# Patient Record
Sex: FEMALE | Race: BLACK OR AFRICAN AMERICAN
[De-identification: names, ages, dates, MRNs, and addresses within clinical notes are randomized per-mention and may not be internally consistent; named-entity substitution may affect disease eponyms.]

---

## 2019-11-10 ENCOUNTER — HOSPITAL ENCOUNTER (EMERGENCY)
Dept: HOSPITAL 74 - JER | Age: 74
LOS: 1 days | Discharge: LEFT BEFORE DISCHARGE | End: 2019-11-11
Payer: COMMERCIAL

## 2019-11-10 VITALS — HEART RATE: 65 BPM | DIASTOLIC BLOOD PRESSURE: 73 MMHG | TEMPERATURE: 97.2 F | SYSTOLIC BLOOD PRESSURE: 163 MMHG

## 2019-11-10 VITALS — BODY MASS INDEX: 34.8 KG/M2

## 2019-11-10 DIAGNOSIS — N18.9: ICD-10-CM

## 2019-11-10 DIAGNOSIS — R42: ICD-10-CM

## 2019-11-10 DIAGNOSIS — N17.9: Primary | ICD-10-CM

## 2019-11-10 DIAGNOSIS — E11.22: ICD-10-CM

## 2019-11-10 DIAGNOSIS — I12.9: ICD-10-CM

## 2019-11-10 DIAGNOSIS — E78.5: ICD-10-CM

## 2019-11-10 DIAGNOSIS — Z79.84: ICD-10-CM

## 2019-11-10 LAB
ALBUMIN SERPL-MCNC: 3.8 G/DL (ref 3.4–5)
ALP SERPL-CCNC: 122 U/L (ref 45–117)
ALT SERPL-CCNC: 20 U/L (ref 13–61)
ANION GAP SERPL CALC-SCNC: 6 MMOL/L (ref 8–16)
APPEARANCE UR: CLEAR
AST SERPL-CCNC: 20 U/L (ref 15–37)
BACTERIA # UR AUTO: 107.4 /HPF
BASOPHILS # BLD: 0.7 % (ref 0–2)
BILIRUB SERPL-MCNC: 0.3 MG/DL (ref 0.2–1)
BILIRUB UR STRIP.AUTO-MCNC: NEGATIVE MG/DL
BUN SERPL-MCNC: 69.3 MG/DL (ref 7–18)
CALCIUM SERPL-MCNC: 9.3 MG/DL (ref 8.5–10.1)
CASTS URNS QL MICRO: 0 /LPF (ref 0–8)
CHLORIDE SERPL-SCNC: 110 MMOL/L (ref 98–107)
CO2 SERPL-SCNC: 24 MMOL/L (ref 21–32)
COLOR UR: YELLOW
CREAT SERPL-MCNC: 3.9 MG/DL (ref 0.55–1.3)
DEPRECATED RDW RBC AUTO: 13.7 % (ref 11.6–15.6)
EOSINOPHIL # BLD: 0.6 % (ref 0–4.5)
EPITH CASTS URNS QL MICRO: 2 /HPF
GLUCOSE SERPL-MCNC: 130 MG/DL (ref 74–106)
HCT VFR BLD CALC: 32.8 % (ref 32.4–45.2)
HGB BLD-MCNC: 10.4 GM/DL (ref 10.7–15.3)
KETONES UR QL STRIP: NEGATIVE
LEUKOCYTE ESTERASE UR QL STRIP.AUTO: NEGATIVE
LYMPHOCYTES # BLD: 9.6 % (ref 8–40)
MCH RBC QN AUTO: 27.5 PG (ref 25.7–33.7)
MCHC RBC AUTO-ENTMCNC: 31.7 G/DL (ref 32–36)
MCV RBC: 86.8 FL (ref 80–96)
MONOCYTES # BLD AUTO: 2.6 % (ref 3.8–10.2)
NEUTROPHILS # BLD: 86.5 % (ref 42.8–82.8)
NITRITE UR QL STRIP: NEGATIVE
PH UR: 6 [PH] (ref 5–8)
PLATELET # BLD AUTO: 292 K/MM3 (ref 134–434)
PMV BLD: 10.3 FL (ref 7.5–11.1)
POTASSIUM SERPLBLD-SCNC: 5.6 MMOL/L (ref 3.5–5.1)
PROT SERPL-MCNC: 8.4 G/DL (ref 6.4–8.2)
PROT UR QL STRIP: (no result)
PROT UR QL STRIP: NEGATIVE
RBC # BLD AUTO: 1 /HPF (ref 0–4)
RBC # BLD AUTO: 3.77 M/MM3 (ref 3.6–5.2)
SODIUM SERPL-SCNC: 139 MMOL/L (ref 136–145)
SP GR UR: 1.01 (ref 1.01–1.03)
UROBILINOGEN UR STRIP-MCNC: 0.2 MG/DL (ref 0.2–1)
WBC # BLD AUTO: 15.9 K/MM3 (ref 4–10)
WBC # UR AUTO: 5 /HPF (ref 0–5)

## 2019-11-10 PROCEDURE — 3E033GC INTRODUCTION OF OTHER THERAPEUTIC SUBSTANCE INTO PERIPHERAL VEIN, PERCUTANEOUS APPROACH: ICD-10-PCS | Performed by: EMERGENCY MEDICINE

## 2019-11-10 NOTE — PDOC
Documentation entered by Analisa Solitario SCRIBE, acting as scribe for David Bauman MD.








David Bauman MD:  This documentation has been prepared by the averyibe, Analisa Solitario SCRIBE, under my direction and personally reviewed by me in its entirety.  I 

confirm that the documentation accurately reflects all work, treatment, 

procedures, and medical decision making performed by me.  





Attending Attestation





- Resident


Resident Name: AdrianoCaprice





- ED Attending Attestation


I have performed the following: I have examined & evaluated the patient, The 

case was reviewed & discussed with the resident, I agree w/resident's findings 

& plan, Exceptions are as noted





- HPI


HPI: 





11/10/19 22:18


74 F with h/o HTN, HLD, DM, presenting to ED with 2 hour episode of room-

spinning dizziness and vomiting. Pt states that she was at home at rest when 

she suddenly felt dizzy. Pt describes a room-spinning sensation that caused her 

to lose her balance. Denies any falls/headstrike. Pt reports having difficulty 

ambulating due to imbalance but denies any weakness/numbness in any extremity. 

Pt denies HA. Endorses one episode of nausea and vomiting. Pt states that her 

dizziness resolved by the time she arrived to the ER. Now denies any dizziness. 

Denies N/V. Denies any complaints.





- Physicial Exam


PE: 





11/10/19 22:20


"GENERAL: Awake, alert, and fully oriented, in no acute distress.


HEAD: No signs of trauma


EYES: PERRLA, EOMI, sclera anicteric, conjunctiva clear


ENT: Auricles normal inspection, hearing grossly normal, nares patent, 

oropharynx clear without exudates. Moist mucosa


NECK: Nontender, no stepoffs, Normal ROM, supple, no lymphadenopathy, JVD, or 

masses


LUNGS: Breath sounds equal, clear to auscultation bilaterally.  No wheezes, and 

no crackles


HEART: Regular rate and rhythm, normal S1 and S2, no murmurs, rubs or gallops


ABDOMEN: Soft, nontender, normoactive bowel sounds.  No guarding, no rebound.  

No masses


EXTREMITIES: Normal range of motion, no edema.  No clubbing or cyanosis. No 

cords, erythema, or tenderness


NEUROLOGICAL: Cranial nerves II through XII intact. 5/5 strength and sensation 

in all extremities, Normal speech, normal gait, normal cerebellar function


SKIN: Warm, Dry, normal turgor, no rashes or lesions noted.





- Medical Decision Making





11/10/19 22:20


74 F with transient episode of room-spinning dizziness. Suspect BPPV. Pt 

without any neuro deficits at this time, now asymptomatic. Low suspicion for CVA

/TIA. Will r/o ACS, though pt with no CP/SOB.


- Labs, trop


- CT head





11/11/19 00:43


CT head negative





Labs notable for HERRERA, Cr 3.7, up from 2





I discussed with pt her results and the need for further evaluation of this 

change in her renal function. I told her if it worsens, she could develop renal 

failure and eventually need dialysis. Pt expresses understanding but states she 

feels well and will f/u with her primary doctor.





The patient is clinically sober, free from distracting injury, appears to have 

intact insight and judgment and reason and in my opinion has the capacity to 

make decisions. The patient presented with dizziness and elevated creatinine. I 

have explained that I am concerned that this may represent renal failure; they 

have verbalized an understanding of my concerns. I have told the patient that 

they could end up on dialysis if this is not addressed. I have discussed the 

need for renal consultation and admission to the hospital to get more 

information about potential causes of the patients renal insufficiency. I have 

told the patient that if they leave, they could get much worse, could become 

critically ill, and could possibly become disabled or die. I have asked them to 

stay in the hospital for admission and renal consultation.  I have discussed 

these concerns with the patients family who is at the bedside and they are 

unable to convince them to stay for further evaluation. The patient is 

unwilling to stay overnight for monitoring. She is refusing any further care 

and is leaving against medical advice. I am unable to convince the patient to 

stay, I have asked them to return as soon as possible to complete their 

evaluation. I have answered all their questions.

## 2019-11-10 NOTE — PDOC
History of Present Illness





- General


Chief Complaint: Lightheaded


Stated Complaint: DIZZINESS/VOMITING


Time Seen by Provider: 11/10/19 20:34


History Source: Patient


Exam Limitations: No Limitations





- History of Present Illness


Initial Comments: 


Pt is a 75 yo F, with PMH of HTN, HLD, DM, CKD, who is presenting with new-

onset vertigo x2 hours prior to presentation. Pt states she was rising from 

sitting to standing, when she felt "the room spinning around". Pt was able to 

ease herself to sitting position, no falls, no LOC. The episode lasted for 

about an hour, and induced 3 episodes of NBNB vomiting. The vertigo improved by 

ED arrival, and she has been able to ambulate on her own. Pt has never had 

these symptoms before. Pt endorses recent nasal congestion and ear "fullness" 

due to her seasonal allergies. Pt denies any recent fevers/chills, headache, 

vision changes, syncope, cough, chest pain, palpitations, SOB, abdominal pain, 

urinary symptoms, diarrhea/constipation, or leg swelling.





Allergies: NKDA


PCP: Dr. Celestina Carpenter





Social: Pt denies any cigarette, alcohol, or drug use.


Pt denies any recent travel or sick contacts.


Surgical: appendectomy, hysterectomy


Family: no relevant history.


11/10/19 22:41











Past History





- Travel


Traveled outside of the country in the last 30 days: No


Close contact w/someone who was outside of country & ill: No





- Past Medical History


Allergies/Adverse Reactions: 


 Allergies











Allergy/AdvReac Type Severity Reaction Status Date / Time


 


No Known Allergies Allergy   Verified 12/08/16 06:32











Home Medications: 


Ambulatory Orders





Aspirin [ASA -] 81 mg PO DAILY 12/08/16 


Atorvastatin Calcium [Lipitor] 10 mg PO HS 12/08/16 


Calcitriol [Calcitriol -] 0.25 mcg PO ANDINO 12/08/16 


Carvedilol [Coreg -] 12.5 mg PO BID 12/08/16 


Glimepiride [Amaryl -] 4 mg PO DAILY 12/08/16 


Sitagliptin Phosphate [Januvia -] 50 mg PO DAILY 12/08/16 


Valsartan [Diovan] 1 tab PO DAILY 12/08/16 


Zolpidem Tartrate [Ambien] 10 mg PO HS 12/08/16 


Levofloxacin [Levaquin] 500 mg PO DAILY #10 tablet 12/12/16 


metroNIDAZOLE [Flagyl -] 500 mg PO TID #30 tablet 12/12/16 








Diabetes: Yes


HTN: Yes


Hypercholesterolemia: Yes





- Psycho Social/Smoking Cessation Hx


Smoking History: Never smoked


Have you smoked in the past 12 months: No


If you are a former smoker, when did you quit?: 5 YRS AGO


Hx Alcohol Use: Yes (2 drinks/ night)


Drug/Substance Use Hx: No


Substance Use Type: None


Hx Substance Use Treatment: No





**Review of Systems





- Review of Systems


Able to Perform ROS?: Yes


Is the patient limited English proficient: No


Constitutional: Yes: Weight Stable.  No: Chills, Diaphoresis, Fever, Loss of 

Appetite, Malaise, Weakness


HEENTM: Yes: Nose Congestion.  No: Recent change in vision, Tinnitus, Hearing 

Loss, Throat Pain, Throat Swelling, Difficulty Swallowing


Respiratory: No: Cough, Orthopnea, Shortness of Breath


Cardiac (ROS): Yes: Edema (b/l leg swelling, at baseline).  No: Chest Pain, 

Irregular Heart Rate, Lightheadedness, Palpitations, Syncope, Chest Tightness


ABD/GI: Yes: Nausea, Vomiting.  No: Constipated, Diarrhea, Poor Appetite, Poor 

Fluid Intake


: No: Burning, Dysuria, Frequency, Flank Pain, Pain, Urgency


Musculoskeletal: No: Back Pain, Joint Pain, Muscle Pain, Muscle Weakness


Integumentary: No: Rash


Neurological: Yes: Dizziness.  No: Headache, Numbness, Seizure, Weakness, 

Unsteady Gait, Ataxia


Psychiatric: No: Sleep Pattern Change, Change in Appetite


Endocrine: No: Increased Urine, Change in Weight


Hematologic/Lymphatic: Yes: Anemia.  No: Blood Clots, Easy Bleeding, Easy 

Bruising


All Other Systems: Reviewed and Negative





*Physical Exam





- Vital Signs


 Last Vital Signs











Temp Pulse Resp BP Pulse Ox


 


 97.2 F L  65   20   163/73   100 


 


 11/10/19 19:39  11/10/19 19:39  11/10/19 19:39  11/10/19 19:39  11/10/19 19:39














- Physical Exam


Comments: 


Vitals stable, pt afebrile. Pt in NAD, obese body habitus. 


Pt alert and oriented x3.


CNs generally intact, muscular strength and sensation intact. Cerebellar exam 

WNL. Romberg negative. Exam did not induce vertigo.


No midline spinal tenderness, step-offs, or crepitus. 


Head normocephalic, atraumatic.


Eyes PERRLA, EOMI. No nystagmus.


Oropharynx without erythema or exudates, no LAD b/l. 


No nasal congestion. 


Hearing intact. 


Clear heart sounds, S1/S2, no JVD, or heart murmur. +b/l pitting pedal edema to 

mid shins (pt states baseline).


Clear lung sounds, no respiratory distress, wheezes, crackles, or accessory 

muscle use. 


No abdominal or CVA tenderness to palpation, no rebound, no guarding. Abdomen 

soft, non-distended, and with normoactive bowel sounds. 


Skin without jaundice or rash. 


11/10/19 22:53








ED Treatment Course





- LABORATORY


CBC & Chemistry Diagram: 


 11/10/19 21:19





 11/10/19 21:19





Medical Decision Making





- Medical Decision Making


Pt was seen at bedside, also will be seen by attending Dr. Bauman. Pt presenting 

with new-onset vertigo, induced with position and associated with vomiting. Pt 

also endorses recent congestion, most likely BPPV/otoliths. Will evaluate for 

ACS, electrolyte imbalances, central vertigo/masses, cerebellar stroke. Pt now 

asymptomatic. 





Provided 500 mL IV NS, 4 mg zofran, 25 mg PO meclizine for improvement of 

vomiting and vertigo.


Will continue to reassess pt and monitor for symptomatic improvement.








ECG: NSR, intervals WNL (HR 66, , QRS 68, QTc 425). No TWIs or 

significant ST segment changes. No significant changes from prior ECG (12/10/

2016).


11/10/19 22:55





CBC: WBC 15 -- potentially reactive from vomiting, will evaluate UA as well


CMP: K 5.6, BUN 69, Cr 3.9 -- worsening CKD vs HERRERA? Elevated from 2016 levels





UA and Ct non-contrast head pending. 


11/10/19 23:00





Ct head with no acute pathology.


UA negative for infection.


Pt wishes to sign AMA, does not wish to stay for continued hydration or repeat 

labwork. 


Strict return precautions with pt understanding. 


11/11/19 00:42








Discharge





- Discharge Information


Problems reviewed: Yes


Clinical Impression/Diagnosis: 


 HERRERA (acute kidney injury), Vertigo





Condition: Stable


Disposition: AGAINST MEDICAL ADVICE





- Admission


No





- Follow up/Referral


Referrals: 


Celestina Carpenter MD [Primary Care Provider] - 





- Patient Discharge Instructions


Patient Printed Discharge Instructions:  DI for Vertigo, DI for Acute Kidney 

Injury


Additional Instructions: 


You were seen in the ER today for dizziness. The results of your labs and 

imaging today showed increased potassium and worsening problems with your 

kidney. You did not wish to stay for further observation and hydration. Please 

follow-up with your primary care doctor and nephrologist within 1-2 days to 

discuss your visit and make sure your symptoms have improved. Please return to 

the ER if you have any worsening dizziness, development of fevers or chills, 

loss of consciousness, inability to tolerate food or fluids, or any other 

concerns.








- Post Discharge Activity

## 2019-11-11 NOTE — EKG
Test Reason : 

Blood Pressure : ***/*** mmHG

Vent. Rate : 066 BPM     Atrial Rate : 066 BPM

   P-R Int : 132 ms          QRS Dur : 068 ms

    QT Int : 406 ms       P-R-T Axes : 043 -29 035 degrees

   QTc Int : 425 ms

 

NORMAL SINUS RHYTHM

NORMAL ECG

WHEN COMPARED WITH ECG OF 10-DEC-2016 06:00,

PREMATURE SUPRAVENTRICULAR COMPLEXES ARE NO LONGER PRESENT

Confirmed by AIYANA KATZ MD ([id number]) on 11/11/2019 10:02:50 AM

 

Referred By:             Confirmed By:AIYANA KATZ MD

## 2020-11-23 ENCOUNTER — HOSPITAL ENCOUNTER (EMERGENCY)
Dept: HOSPITAL 74 - JER | Age: 75
LOS: 1 days | Discharge: HOME | End: 2020-11-24
Payer: COMMERCIAL

## 2020-11-23 VITALS — HEART RATE: 70 BPM | SYSTOLIC BLOOD PRESSURE: 153 MMHG | DIASTOLIC BLOOD PRESSURE: 56 MMHG

## 2020-11-23 VITALS — TEMPERATURE: 98.2 F

## 2020-11-23 VITALS — BODY MASS INDEX: 28.4 KG/M2

## 2020-11-23 DIAGNOSIS — N39.0: ICD-10-CM

## 2020-11-23 DIAGNOSIS — E87.5: Primary | ICD-10-CM

## 2020-11-23 LAB
ALBUMIN SERPL-MCNC: 3.4 G/DL (ref 3.4–5)
ALP SERPL-CCNC: 118 U/L (ref 45–117)
ALT SERPL-CCNC: 18 U/L (ref 13–61)
ANION GAP SERPL CALC-SCNC: 5 MMOL/L (ref 8–16)
ANION GAP SERPL CALC-SCNC: 6 MMOL/L (ref 8–16)
APPEARANCE UR: (no result)
AST SERPL-CCNC: 15 U/L (ref 15–37)
BACTERIA # UR AUTO: (no result) /UL (ref 0–1359)
BASOPHILS # BLD: 0.8 % (ref 0–2)
BILIRUB SERPL-MCNC: 0.3 MG/DL (ref 0.2–1)
BILIRUB UR STRIP.AUTO-MCNC: NEGATIVE MG/DL
BUN SERPL-MCNC: 63.9 MG/DL (ref 7–18)
BUN SERPL-MCNC: 64.8 MG/DL (ref 7–18)
CALCIUM SERPL-MCNC: 8.6 MG/DL (ref 8.5–10.1)
CALCIUM SERPL-MCNC: 8.6 MG/DL (ref 8.5–10.1)
CASTS URNS QL MICRO: 1 /UL (ref 0–3.1)
CHLORIDE SERPL-SCNC: 111 MMOL/L (ref 98–107)
CHLORIDE SERPL-SCNC: 112 MMOL/L (ref 98–107)
CO2 SERPL-SCNC: 23 MMOL/L (ref 21–32)
CO2 SERPL-SCNC: 23 MMOL/L (ref 21–32)
COLOR UR: YELLOW
CREAT SERPL-MCNC: 4.5 MG/DL (ref 0.55–1.3)
CREAT SERPL-MCNC: 4.7 MG/DL (ref 0.55–1.3)
DEPRECATED RDW RBC AUTO: 13.7 % (ref 11.6–15.6)
EOSINOPHIL # BLD: 1.3 % (ref 0–4.5)
EPITH CASTS URNS QL MICRO: 22 /UL (ref 0–25.1)
GLUCOSE SERPL-MCNC: 131 MG/DL (ref 74–106)
GLUCOSE SERPL-MCNC: 133 MG/DL (ref 74–106)
HCT VFR BLD CALC: 28.5 % (ref 32.4–45.2)
HGB BLD-MCNC: 9.1 GM/DL (ref 10.7–15.3)
KETONES UR QL STRIP: NEGATIVE
LEUKOCYTE ESTERASE UR QL STRIP.AUTO: (no result)
LYMPHOCYTES # BLD: 19.6 % (ref 8–40)
MCH RBC QN AUTO: 27.5 PG (ref 25.7–33.7)
MCHC RBC AUTO-ENTMCNC: 32 G/DL (ref 32–36)
MCV RBC: 85.8 FL (ref 80–96)
MONOCYTES # BLD AUTO: 5.2 % (ref 3.8–10.2)
NEUTROPHILS # BLD: 73.1 % (ref 42.8–82.8)
NITRITE UR QL STRIP: POSITIVE
PH UR: 5 [PH] (ref 5–8)
PLATELET # BLD AUTO: 276 K/MM3 (ref 134–434)
PMV BLD: 9.3 FL (ref 7.5–11.1)
POTASSIUM SERPLBLD-SCNC: 5.7 MMOL/L (ref 3.5–5.1)
POTASSIUM SERPLBLD-SCNC: 6 MMOL/L (ref 3.5–5.1)
PROT SERPL-MCNC: 7.8 G/DL (ref 6.4–8.2)
PROT UR QL STRIP: (no result)
PROT UR QL STRIP: NEGATIVE
RBC # BLD AUTO: 3.32 M/MM3 (ref 3.6–5.2)
RBC # BLD AUTO: 6 /UL (ref 0–23.9)
SODIUM SERPL-SCNC: 139 MMOL/L (ref 136–145)
SODIUM SERPL-SCNC: 141 MMOL/L (ref 136–145)
SP GR UR: 1.01 (ref 1.01–1.03)
UROBILINOGEN UR STRIP-MCNC: 0.2 MG/DL (ref 0.2–1)
WBC # BLD AUTO: 10.5 K/MM3 (ref 4–10)
WBC # UR AUTO: 363 /UL (ref 0–25.8)

## 2020-11-23 PROCEDURE — 3E033GC INTRODUCTION OF OTHER THERAPEUTIC SUBSTANCE INTO PERIPHERAL VEIN, PERCUTANEOUS APPROACH: ICD-10-PCS

## 2020-11-23 PROCEDURE — 3E013VG INTRODUCTION OF INSULIN INTO SUBCUTANEOUS TISSUE, PERCUTANEOUS APPROACH: ICD-10-PCS

## 2020-11-24 LAB
ANION GAP SERPL CALC-SCNC: 6 MMOL/L (ref 8–16)
BUN SERPL-MCNC: 62.4 MG/DL (ref 7–18)
CALCIUM SERPL-MCNC: 8.3 MG/DL (ref 8.5–10.1)
CHLORIDE SERPL-SCNC: 115 MMOL/L (ref 98–107)
CO2 SERPL-SCNC: 22 MMOL/L (ref 21–32)
CREAT SERPL-MCNC: 4.5 MG/DL (ref 0.55–1.3)
GLUCOSE SERPL-MCNC: 109 MG/DL (ref 74–106)
POTASSIUM SERPLBLD-SCNC: 5 MMOL/L (ref 3.5–5.1)
SODIUM SERPL-SCNC: 144 MMOL/L (ref 136–145)